# Patient Record
(demographics unavailable — no encounter records)

---

## 2024-12-02 NOTE — ASSESSMENT
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 Now treated hypothyroidism. Recurrent fatigue and daytime sleepiness. Anemia, uncontrolled hypothyroidism have been ruled out. She is currently off MTX and on moderate dose prednisone.  - Continue off Farxiga and Metoprolol XL for now - Off MTX  - Will see pulm next month where she expects her pred will be tapered - RTC 3 months at which time we will repeat her PET off of MTX - Continue treatment with LT4 for hypothyroidism  Siddhartha Mart MD

## 2024-12-02 NOTE — HISTORY OF PRESENT ILLNESS
[FreeTextEntry1] : Date of HF diagnosis: 2019 Etiology of CMP: cardiac sarcoidosis Date of last hospitalization: 2019 Date of last echocardiogram: 1/2023 LVEF/LVEDD: 68%/5.0 cm Type of ischemic work-up: LHC Prior RHC: no  Diabetes: no Afib: no CKD: no ICD/CRT: yes  ACEi/ARB: no ARNI: no MRA: no BB: yes SGLT2i: yes Nitrates/Hydralazine: no  CardioMEMs: no In clinical trial: no  60 yo F PMHx of pulmonary and cardiac sarcoidosis as well as hypothyroidism here for f/u.  She was diagnosed with cardiac sarcoidosis in 10/2019, where she was admitted for syncope and found to have Vtach. cMRI showed hyperenhancement of the inferolateral and apical septal walls, LHC was negative for obstructive coronary disease. FDG-PET showed increase myocardial uptake consistent with cardiac sarcoidosis, EF was preserved at that time. She was started on prednisone and eventually transitioned to steroid sparing Methotrexate due to side effects from prednisone.  She has been stable on prednisone/MTX, one shock in 07/2021, then was shocked twice by her device in 11/2023. She reports feeling lightheaded during this episode. Her device was interrogated by EP and confirmed VT. Since coming to this clinic, an interval FDG- PET did not show any cardiac reactivity but persistent uptake in other tissues.  While followed up she was started on Farxiga as a trial. Her TSH was also found to be severely elevated, so she was treated.  Since I last saw her she has stopped SGLT2i and BB. I see a message in 6/2024 where Dr. Schofield refilled her BB ahead of a trip. She has been off of these meds for several months but feels the same.   She was also weaned off MTX by Dr. Nicole and left on Prednisone, initially 7.5 mg daily and later increased to 20 mg and now at 15 mg. She feels well wit no LE edema, orthopnea.  Has NYHA class I symptoms.   Her ESS is low and I have low suspicion for GIACOMO clinically.

## 2024-12-02 NOTE — CARDIOLOGY SUMMARY
[de-identified] : Jan/2023:  1. Left ventricular systolic function is normal with a calculated ejection fraction of 63 % by Rhodes's biplane method of discs. 2. Right ventricular systolic function is normal. 3. Normal atria. 4. Mild mitral regurgitation. 5. Pulmonary artery systolic pressure could not be estimated. 6. Pericardial fat pad is seen anterior to the right ventricle. 7. Small pericardial effusion noted adjacent to the posterolateral left ventricle.  [de-identified] : PET scan:  There is no evidence of cardiac sarcoid, nor is there uptake to suggest sarcoidosis elsewhere on the scan from the base of the skull to the midthigh.  There is diffuse FDG uptake and sestamibi uptake throughout the thyroid, findings most commonly seen in patients with Hashimoto's thyroiditis.

## 2024-12-02 NOTE — HISTORY OF PRESENT ILLNESS
[FreeTextEntry1] : Date of HF diagnosis: 2019 Etiology of CMP: cardiac sarcoidosis Date of last hospitalization: 2019 Date of last echocardiogram: 1/2023 LVEF/LVEDD: 68%/5.0 cm Type of ischemic work-up: LHC Prior RHC: no  Diabetes: no Afib: no CKD: no ICD/CRT: yes  ACEi/ARB: no ARNI: no MRA: no BB: yes SGLT2i: yes Nitrates/Hydralazine: no  CardioMEMs: no In clinical trial: no  58 yo F PMHx of pulmonary and cardiac sarcoidosis as well as hypothyroidism here for f/u.  She was diagnosed with cardiac sarcoidosis in 10/2019, where she was admitted for syncope and found to have Vtach. cMRI showed hyperenhancement of the inferolateral and apical septal walls, LHC was negative for obstructive coronary disease. FDG-PET showed increase myocardial uptake consistent with cardiac sarcoidosis, EF was preserved at that time. She was started on prednisone and eventually transitioned to steroid sparing Methotrexate due to side effects from prednisone.  She has been stable on prednisone/MTX, one shock in 07/2021, then was shocked twice by her device in 11/2023. She reports feeling lightheaded during this episode. Her device was interrogated by EP and confirmed VT. Since coming to this clinic, an interval FDG- PET did not show any cardiac reactivity but persistent uptake in other tissues.  While followed up she was started on Farxiga as a trial. Her TSH was also found to be severely elevated, so she was treated.  Since I last saw her she has stopped SGLT2i and BB. I see a message in 6/2024 where Dr. Schofield refilled her BB ahead of a trip. She has been off of these meds for several months but feels the same.   She was also weaned off MTX by Dr. Nicole and left on Prednisone, initially 7.5 mg daily and later increased to 20 mg and now at 15 mg. She feels well wit no LE edema, orthopnea.  Has NYHA class I symptoms.   Her ESS is low and I have low suspicion for GIACOMO clinically.

## 2025-01-29 NOTE — HISTORY OF PRESENT ILLNESS
[Never] : never [TextBox_4] : Pt of Dr Bey since 11/2019 referred for possible sarcoidosis. 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 Hospitalized for VT, clean coronaries on cath, mod-sev MR, underwent EP study and ablation for atrial flutter, ICD placed after MRI suggested cardiac sarcoid. CT chest was compatible w pulmonary sarcoid but EBUS/TBBx nondiagnostic.  Cardiac PET at [facility] showed active inflammation c/w cardiac sarcoid - started 30mg prednisone w Bactrim on 9/1/20. Prednisone therapy was complicated by mood changes and insomnia as well as modest weight gain and dyspepsia and so we initiated MTX in 11/2020 to spare prednisone. MTX was titrated up to 15mg weekly in 12/2020 and we did taper her off pred in early 2021.  In July 2021 after travel to [facility], after about 36 hrs of  air travel back home and after clearing customs at [facility] she had syncope due to VT/VF w appropriate shock. Cardiac PET at that time did not show myocardial inflammation. At this point in 9/2021 I did put her back on prednisone 10mg for cough and this helped with the cough as well as some general fatigue and feeling lousy. We subsequently dropped this to 10mg qod and then 7.5mg qod.  She had Covid in 11/2022 and took Paxlovid. She subsequently had an appropriate shock for VT and was found to have very high TSH. She saw Dr Bruno Mart for heart failure and Dr Hoyos for endocrinology. A repeat cardiac PET again did not show active inflammation. Since then has generally done well on MTX; in spring 2023 had one episode NSVT lasting <1 sec, no shock, symptomatic. In 2/2024 we made the decision to taper her off MTX and she was off pred by 4/2024. We then tapered prednisone off by 6/2024 but due to increasing symptoms of fatigue, headache, joint and muscle pains, we put her back on prednisone in 7/2024 and she felt good on 20mg and we tapered to 15mg and planned to continue tapering.  1/29/2025: At last visit (9/2024) we dropped her pred to 15mg and she was well till end Dec when she traveled to [place] and got sick coming home. Severe pharyngitis, cough, I was on vacation so she went to [facility] 4 or so days into the illness and was Covid/flu negative and was given Z pack and tessalon, didn't feel a lot better. Went back 4-5 days later, told basically just wait it out, no more testing or treatment. Had to cancel a trip due to feeling sick. Throat is still scratchy, voice is weird, cannot get rid of cough, super busy at work and exhausted all the time. No fever, not as much phlegm, not dyspneic but feels tight. Lots of PND. Leaving for a 3 week trip to [place] in a month.

## 2025-01-29 NOTE — ASSESSMENT
[FreeTextEntry1] : Data reviewed:  TPMT normal 10/2021  Cardiac PET [facility] 8/2020: metabolic-perfusion mismatch suggestive of active myocardial inflammation; FDG-avid hepatic lesions and possible splenic lesions Cardiac PET [facility] 4/2021: nearly resolved areas of FDG avidity with residual mild FDG avidity in basal inferior wall correlating w area of hypoperfusion / decreased FDG avid hepatic and splenic lesions Cardiac PET [facility] 9/21/21: perfusion defects and mild hypokinesis of basal septum, resting EF 52% / interval resolution of previous FDG updake in myocardium / increased uptake in jammie hilar nodes / interval increased activity in liver and spleen  CT chest [facility] 8/2021 : mild interstitial abnormality PA/lat CXR 9/28/22: clear, AICD  [facility] 8/2/21: restricted, FEV1 78%  Impression: Viral infection with prolonged symptoms Sarcoidosis, cardiac and pulmonary involvement Cardiac sarcoid w appropriate shock 7/2021 and 11/2022 w no inflammation on cardiac PET  - MTX 10/2020 - 4/2024  - cannot tolerate higher doses prednisone Uptake in hilar nodes, liver, spleen 9/2021 Cough   - managed w pred 7.5mg qod - tapered to off June 2024  - back on 20mg 7/2024 for variety of symptoms w good effect, now at 15mg since 12/2024  Plan: Add Dymista and Symbicort for symptoms. Pred 40 x 5 then return to 15mg daily. Labs sent. Get CXR. See me next month as planned and hope to continue tapering prednisone. -- PA/lat CXR [facility] 1/29/2025 personally reviewed : clear lungs, AICD in place

## 2025-02-20 NOTE — HISTORY OF PRESENT ILLNESS
[Never] : never [TextBox_4] : Pt of Dr Bey since 11/2019 referred for possible sarcoidosis. 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 Hospitalized for VT, clean coronaries on cath, mod-sev MR, underwent EP study and ablation for atrial flutter, ICD placed after MRI suggested cardiac sarcoid. CT chest was compatible w pulmonary sarcoid but EBUS/TBBx nondiagnostic.  Cardiac PET at [facility] showed active inflammation c/w cardiac sarcoid - started 30mg prednisone w Bactrim on 9/1/20. Prednisone therapy was complicated by mood changes and insomnia as well as modest weight gain and dyspepsia and so we initiated MTX in 11/2020 to spare prednisone. MTX was titrated up to 15mg weekly in 12/2020 and we did taper her off pred in early 2021.  In July 2021 after travel to [facility], after about 36 hrs of  air travel back home and after clearing customs at [facility] she had syncope due to VT/VF w appropriate shock. Cardiac PET at that time did not show myocardial inflammation. At this point in 9/2021 I did put her back on prednisone 10mg for cough and this helped with the cough as well as some general fatigue and feeling lousy. We subsequently dropped this to 10mg qod and then 7.5mg qod.  She had Covid in 11/2022 and took Paxlovid. She subsequently had an appropriate shock for VT and was found to have very high TSH. She saw Dr Bruno Mart for heart failure and Dr Hoyos for endocrinology. A repeat cardiac PET again did not show active inflammation. Since then has generally done well on MTX; in spring 2023 had one episode NSVT lasting <1 sec, no shock, symptomatic. In 2/2024 we made the decision to taper her off MTX and she was off pred by 4/2024. We then tapered prednisone off by 6/2024 but due to increasing symptoms of fatigue, headache, joint and muscle pains, we put her back on prednisone in 7/2024 and she felt good on 20mg and we tapered to 15mg and planned to continue tapering.  1/29/2025: At last visit (9/2024) we dropped her pred to 15mg and she was well till end Dec when she traveled to [place] and got sick coming home. Severe pharyngitis, cough, I was on vacation so she went to [facility] 4 or so days into the illness and was Covid/flu negative and was given Z pack and tessalon, didn't feel a lot better. Went back 4-5 days later, told basically just wait it out, no more testing or treatment. Had to cancel a trip due to feeling sick. Throat is still scratchy, voice is weird, cannot get rid of cough, super busy at work and exhausted all the time. No fever, not as much phlegm, not dyspneic but feels tight. Lots of PND. Leaving for a 3 week trip to [facility] in a month.  2/20/2025 [[facility]]: She is doing well. She feels much better since starting the Azelastine and Symbicort. She finished the Symbicort and is now just on the nasal spray. She says her cough has resolved and congestion is much better. No recent fevers, chill, infections. Exercises occasionally, admits it has been harder in the winter but no issues with activity.

## 2025-02-20 NOTE — REVIEW OF SYSTEMS
[Nasal Congestion] : nasal congestion [Negative] : Endocrine [TextBox_14] : Nasal congestion in the morning

## 2025-02-20 NOTE — ASSESSMENT
[FreeTextEntry1] : Data reviewed:  TPMT normal 10/2021  Cardiac PET [facility] 8/2020: metabolic-perfusion mismatch suggestive of active myocardial inflammation; FDG-avid hepatic lesions and possible splenic lesions Cardiac PET [facility] 4/2021: nearly resolved areas of FDG avidity with residual mild FDG avidity in basal inferior wall correlating w area of hypoperfusion / decreased FDG avid hepatic and splenic lesions Cardiac PET [facility] 9/21/21: perfusion defects and mild hypokinesis of basal septum, resting EF 52% / interval resolution of previous FDG updake in myocardium / increased uptake in jammie hilar nodes / interval increased activity in liver and spleen  CT chest [facility] 8/2021 : mild interstitial abnormality PA/lat CXR Elida 1/29/2025 personally reviewed : clear lungs, AICD in place  [facility] 8/2/21: restricted, FEV1 78%  Impression: Sarcoidosis, cardiac and pulmonary involvement Cardiac sarcoid w appropriate shock 7/2021 and 11/2022 w no inflammation on cardiac PET  - MTX 10/2020 - 4/2024  - cannot tolerate higher doses prednisone Uptake in hilar nodes, liver, spleen 9/2021 Cough   - managed w pred 7.5mg qod - tapered to off June 2024  - back on 20mg 7/2024 for variety of symptoms w good effect, now at 15mg since 12/2024  Plan: Continue Dymista, refill sent Will refill Symbicort for use prn Following up with her primary care concerning her synthroid dosing Follows with Dr. Carr for cardiology Continue prednisone 15 mg PO qd, discussed reducing dose to 10 mg once she gets back from vacation Ambien refill sent  Will follow up in 4-6 months or sooner if needed -- Attending Addendum  Seen and examined by me and agree w above.

## 2025-03-20 NOTE — REASON FOR VISIT
[Initial Evaluation] : an initial evaluation [Hypothyroidism] : hypothyroidism [DM Type 2] : DM Type 2

## 2025-03-24 NOTE — HISTORY OF PRESENT ILLNESS
[FreeTextEntry1] : Alisia Duncan is a 61 year old female who presents to establish care for hypothyroidism and Diabetes Mellitus.   Previously saw Dr. Hoyos, last in May 2024  PMHx: atrial flutter, hypothyroidism, ICD, obesity, cardiac/pulmonary sarcoidosis, Obesity, Type 2 DM FMHx: pulmonary fibrosis (mother), cardiac disorder/DM (father) PSHx: pacemaker insertion NKDA    Diagnosed with hypothyroidism around 2000 -- had symptoms of fatigue, diagnosed at a wellness center. Took levothyroxine for a while but stopped all medications after her  passed in 2014 Moved to [place] [facility] beds at the [facility] in 2016 and restarted meds, tried to get right dosage, has taken 125, 137, 150mcg in the past.  Was taking 150mcg up until early February 2025 when her PCP reduced dose to 137mcg daily after TSH low (TSH 0.09, FT4 1.8)  Current regimen: Levothyroxine 137mcg daily (has been taking for last 6 weeks) Previous regimens: 125, 137, 150mcg  + antithyroid antibodies (TPO/Tg, see data reviewed) Has had thyroid US in the past (many years ago, around 2001/2002), doesn't think they saw nodules Denies personal/family history of thyroid cancer Denies radiation exposure No amiodarone/lithium use Denies family history of thyroid disease. + other autoimmune disease (sarcoidosis) Denies obstructive symptoms of thyroid disease such as difficulty swallowing, difficulty breathing, voice changes  -- Feels fine now, has lost about 10lbs in the last 6 weeks (attributes to Zepbound/not eating much while in [place] for last 3 weeks). Denies hypo- and hyper- thryoid symptoms   Diabetes, diagnosed on annual physical bloodwork in February 2025 with A1c 6.6% After diagnosis of DM, was started on Zepbound 2.5mg weekly by PCP  She has taken 4 doses of Zepbound and is tolerating well (last dose was 10 days ago; she ran out after trip to [place]) Has lost ~10lbs over past 6 weeks but attributes this more to exercise and limited intake in [place] than Zepbound as most weight loss happened while in [place]  Has taken steroids since diagnosis with cardiac sarcoidosis in 2019 -- initially on high doses, gradually reduced and stopped but restarted in 2024 after recurrence of symptoms   Does not check BG at home Denies symptoms of hypos Last ophthalmologist was in February 2025, no  Denies neuropathy, knowledge of albuminuria, CVD events Has not seen podiatrist  Exercise --walks her dog for total of ~1 hour daily (30 min at each time) Diet  -- Eats more of less 3 meals/day, had noticed decreased nighttime snacking w/Zepbound B - Yogurt and fruit and granola  L - fast food if in office, may skip or have leftovers if at home D- meat/starch/vegetable (boyfriend cooks most nights) Drinks diet coke infrequently, no juice. Mostly seltzer Infrequent dessert intake  Social Hx: Very occasional alcohol use No nicotine/drug use Lives w/boyfriend, no kids. Works as  w/[employer]   Current Medications: albuterol PRN, arexvy IM injection, symbicort 2 puffs BID, flonase, prednisone 15mg daily, zolpidem 10mg daily, levothyroxine 137mcg daily, Zepbound 2.5mg weekly

## 2025-03-24 NOTE — REVIEW OF SYSTEMS
[Recent Weight Loss (___ Lbs)] : recent weight loss: [unfilled] lbs [Negative] : Endocrine [Dysphagia] : no dysphagia [Dysphonia] : no dysphonia [Nausea] : no nausea [Vomiting] : no vomiting

## 2025-03-24 NOTE — ASSESSMENT
[FreeTextEntry1] : 1. Hashimoto's hypothyroidism, diagnosed ~2000 Currently taking levothyroxine 137mcg daily (dose reduced by PCP from 150mcg daily in February 2025 after TSH 0.09, FT4 1.8) Denies symptoms of hypo- and hyper-thyroidism. Appears clinically euthyroid.  Plan -- Labs today, will call w/results -- Continue levothyroxine 137mcg daily unless labs indicate otherwise -- Follow up in 3 months with MD, with myself in Fall 2025  2. Type 2 DM, well controlled, with no known complications   Recently diagnosed with A1c 6.6% in February 2025 (after period of pre-diabetes). Likely steroid induced/impacted as she is taking prednisone 15mg daily for cardiac/pulmonary sarcoidosis. Was started on Zepbound 2.5mg weekly in February by PCP; she is tolerating this well.  We discussed physiology of DM, insulin resistance associated with obesity/steroid use/genetics, carbohydrate metabolism, impact of exercise/activity, blood glucose goals, and use of medication to manage DM. Discussed importance of taking medication, weight loss, managing glucose well to prevent progression given chronic steroid use.   Goal A1c <7%, A1c 6.6% in February Goal BP <130/80, BP today 131/69, not on regimen Goal LDL <100mg/dL, recent , not on regimen   Plan -- Screen for albuminuria -- Increase Zepbound to 5mg weekly (discussed that this is same medication as Mounjaro but as she knows Zepbound is covered w/o auth by insurance, would like to continue with this medication) -- Start checking BG at home few times/week, FBS/PPBS to write on log and bring to next appt. -- Reviewed and encouraged lifestyle modifications for good DM care including aiming for 150 min moderate exercise weekly and ADA healthy eating tips. -- Reviewed hypoglycemia protocol (15/15 rule) -- Continue to follow up with ophthalmologist. -- Follow up with MD in 3 months, with myself in Fall 2025  3. Left thyroid nodule + history of hypothyroidism and antithyroid antibodies Noted on exam, denies compressive symptoms Denies personal/family history of thyroid cancer  Plan -- Thyroid US ordered -- TFTs today, will call w/results -- Follow up with MD in 3 months, with me in Fall 2025

## 2025-03-24 NOTE — HISTORY OF PRESENT ILLNESS
[FreeTextEntry1] : Alisia Duncan is a 61 year old female who presents to establish care for hypothyroidism and Diabetes Mellitus.   Previously saw Dr. Hoyos, last in May 2024  PMHx: atrial flutter, hypothyroidism, ICD, obesity, cardiac/pulmonary sarcoidosis, Obesity, Type 2 DM FMHx: pulmonary fibrosis (mother), cardiac disorder/DM (father) PSHx: pacemaker insertion NKDA    Diagnosed with hypothyroidism around 2000 -- had symptoms of fatigue, diagnosed at a wellness center. Took levothyroxine for a while but stopped all medications after her  passed in 2014 Moved to [facility] in 2016 and restarted meds, tried to get right dosage, has taken 125, 137, 150mcg in the past.  Was taking 150mcg up until early February 2025 when her PCP reduced dose to 137mcg daily after TSH low (TSH 0.09, FT4 1.8)  Current regimen: Levothyroxine 137mcg daily (has been taking for last 6 weeks) Previous regimens: 125, 137, 150mcg  + antithyroid antibodies (TPO/Tg, see data reviewed) Has had thyroid US in the past (many years ago, around 2001/2002), doesn't think they saw nodules Denies personal/family history of thyroid cancer Denies radiation exposure No amiodarone/lithium use Denies family history of thyroid disease. + other autoimmune disease (sarcoidosis) Denies obstructive symptoms of thyroid disease such as difficulty swallowing, difficulty breathing, voice changes  -- Feels fine now, has lost about 10lbs in the last 6 weeks (attributes to Zepbound/not eating much while in [place] for last 3 weeks). Denies hypo- and hyper- thryoid symptoms   Diabetes, diagnosed on annual physical bloodwork in February 2025 with A1c 6.6% After diagnosis of DM, was started on Zepbound 2.5mg weekly by PCP  She has taken 4 doses of Zepbound and is tolerating well (last dose was 10 days ago; she ran out after trip to [place]) Has lost ~10lbs over past 6 weeks but attributes this more to exercise and limited intake in [place] than Zepbound as most weight loss happened while in [place]  Has taken steroids since diagnosis with cardiac sarcoidosis in 2019 -- initially on high doses, gradually reduced and stopped but restarted in 2024 after recurrence of symptoms   Does not check BG at home Denies symptoms of hypos Last ophthalmologist was in February 2025, no  Denies neuropathy, knowledge of albuminuria, CVD events Has not seen podiatrist  Exercise --walks her dog for total of ~1 hour daily (30 min at each time) Diet  -- Eats more of less 3 meals/day, had noticed decreased nighttime snacking w/Zepbound B - Yogurt and fruit and granola  L - fast food if in office, may skip or have leftovers if at home D- meat/starch/vegetable (boyfriend cooks most nights) Drinks diet coke infrequently, no juice. Mostly seltzer Infrequent dessert intake  Social Hx: Very occasional alcohol use No nicotine/drug use Lives w/boyfriend, no kids. Works as  w/[employer]   Current Medications: albuterol PRN, arexvy IM injection, symbicort 2 puffs BID, flonase, prednisone 15mg daily, zolpidem 10mg daily, levothyroxine 137mcg daily, Zepbound 2.5mg weekly

## 2025-03-24 NOTE — PHYSICAL EXAM
[Alert] : alert [No Acute Distress] : no acute distress [EOMI] : extra ocular movement intact [Normal Hearing] : hearing was normal [No Respiratory Distress] : no respiratory distress [No Accessory Muscle Use] : no accessory muscle use [Normal Rate and Effort] : normal respiratory rate and effort [Clear to Auscultation] : lungs were clear to auscultation bilaterally [Normal S1, S2] : normal S1 and S2 [Normal Rate] : heart rate was normal [No Edema] : no peripheral edema [Pedal Pulses Normal] : the pedal pulses are present [Normal Bowel Sounds] : normal bowel sounds [Not Tender] : non-tender [Soft] : abdomen soft [Spine Straight] : spine straight [Normal Gait] : normal gait [No Joint Swelling] : no joint swelling seen [Right foot was examined, including] : right foot ~C was examined, including visual inspection with sensory and pulse exams [Left foot was examined, including] : left foot ~C was examined, including visual inspection with sensory and pulse exams [Normal] : normal [2+] : 2+ in the dorsalis pedis [Oriented x3] : oriented to person, place, and time [Normal Insight/Judgement] : insight and judgment were intact [Acanthosis Nigricans] : no acanthosis nigricans [de-identified] : possible left thyroid nodules on exam [de-identified] : mild moon facies. no SC fat pads or buffalo hump.

## 2025-03-24 NOTE — HISTORY OF PRESENT ILLNESS
[FreeTextEntry1] : Alisia Duncan is a 61 year old female who presents to establish care for hypothyroidism and Diabetes Mellitus.   Previously saw Dr. Hoyos, last in May 2024  PMHx: atrial flutter, hypothyroidism, ICD, obesity, cardiac/pulmonary sarcoidosis, Obesity, Type 2 DM FMHx: pulmonary fibrosis (mother), cardiac disorder/DM (father) PSHx: pacemaker insertion NKDA    Diagnosed with hypothyroidism around 2000 -- had symptoms of fatigue, diagnosed at a wellness center. Took levothyroxine for a while but stopped all medications after her  passed in 2014 Moved to [facility] in 2016 and restarted meds, tried to get right dosage, has taken 125, 137, 150mcg in the past.  Was taking 150mcg up until early February 2025 when her PCP reduced dose to 137mcg daily after TSH low (TSH 0.09, FT4 1.8)  Current regimen: Levothyroxine 137mcg daily (has been taking for last 6 weeks) Previous regimens: 125, 137, 150mcg  + antithyroid antibodies (TPO/Tg, see data reviewed) Has had thyroid US in the past (many years ago, around 2001/2002), doesn't think they saw nodules Denies personal/family history of thyroid cancer Denies radiation exposure No amiodarone/lithium use Denies family history of thyroid disease. + other autoimmune disease (sarcoidosis) Denies obstructive symptoms of thyroid disease such as difficulty swallowing, difficulty breathing, voice changes  -- Feels fine now, has lost about 10lbs in the last 6 weeks (attributes to Zepbound/not eating much while in [place] for last 3 weeks). Denies hypo- and hyper- thryoid symptoms   Diabetes, diagnosed on annual physical bloodwork in February 2025 with A1c 6.6% After diagnosis of DM, was started on Zepbound 2.5mg weekly by PCP  She has taken 4 doses of Zepbound and is tolerating well (last dose was 10 days ago; she ran out after trip to [place]) Has lost ~10lbs over past 6 weeks but attributes this more to exercise and limited intake in [place] than Zepbound as most weight loss happened while in [place]  Has taken steroids since diagnosis with cardiac sarcoidosis in 2019 -- initially on high doses, gradually reduced and stopped but restarted in 2024 after recurrence of symptoms   Does not check BG at home Denies symptoms of hypos Last ophthalmologist was in February 2025, no  Denies neuropathy, knowledge of albuminuria, CVD events Has not seen podiatrist  Exercise --walks her dog for total of ~1 hour daily (30 min at each time) Diet  -- Eats more of less 3 meals/day, had noticed decreased nighttime snacking w/Zepbound B - Yogurt and fruit and granola  L - fast food if in office, may skip or have leftovers if at home D- meat/starch/vegetable (boyfriend cooks most nights) Drinks diet coke infrequently, no juice. Mostly seltzer Infrequent dessert intake  Social Hx: Very occasional alcohol use No nicotine/drug use Lives w/boyfriend, no kids. Works as  w/[employer]   Current Medications: albuterol PRN, arexvy IM injection, symbicort 2 puffs BID, flonase, prednisone 15mg daily, zolpidem 10mg daily, levothyroxine 137mcg daily, Zepbound 2.5mg weekly

## 2025-03-24 NOTE — PHYSICAL EXAM
[Alert] : alert [No Acute Distress] : no acute distress [EOMI] : extra ocular movement intact [Normal Hearing] : hearing was normal [No Respiratory Distress] : no respiratory distress [No Accessory Muscle Use] : no accessory muscle use [Normal Rate and Effort] : normal respiratory rate and effort [Clear to Auscultation] : lungs were clear to auscultation bilaterally [Normal S1, S2] : normal S1 and S2 [Normal Rate] : heart rate was normal [No Edema] : no peripheral edema [Pedal Pulses Normal] : the pedal pulses are present [Normal Bowel Sounds] : normal bowel sounds [Not Tender] : non-tender [Soft] : abdomen soft [Spine Straight] : spine straight [Normal Gait] : normal gait [No Joint Swelling] : no joint swelling seen [Right foot was examined, including] : right foot ~C was examined, including visual inspection with sensory and pulse exams [Left foot was examined, including] : left foot ~C was examined, including visual inspection with sensory and pulse exams [Normal] : normal [2+] : 2+ in the dorsalis pedis [Oriented x3] : oriented to person, place, and time [Normal Insight/Judgement] : insight and judgment were intact [Acanthosis Nigricans] : no acanthosis nigricans [de-identified] : possible left thyroid nodules on exam [de-identified] : mild moon facies. no SC fat pads or buffalo hump.

## 2025-03-24 NOTE — DATA REVIEWED
[FreeTextEntry1] : 2/11/25 total-c 223, , HDL 90, Tg 89 HGB 13.4, HCT 42.9, RDW 13% TSH 0.09, FT4 1.8 HbA1c 6.6% serum creatinine 0.84, GFR 79  1/29/25 - TSH 0.20  7/24/24 - TSH 0.29  5/1/24 -- TSH 1.26  2/23/24 -- TSH 8.43  8/11/23 -- TSH 3.86  01/2023 -- TPO Ab 3650, Tg Ab 64.2  Dec 2022 - TSH 82.80

## 2025-03-24 NOTE — ADDENDUM
[FreeTextEntry1] : 3/21/25 Called pt with results of labs from 3/21/25 TSH 0.03, FT4 2.3 (this is from TSH 0.09, FT4 1.8 on 2/11/25 and TSH 0.2, FT4 1.9 on 1/29/25) I added on TSH R Ab and TSI to labs from yesterday and will look out for results Given the progressive decrease in TSH/increase in FT4 despite decrease in levothyroxine dose, recommended reduction to 112mcg daily and repeat labs in 12 weeks (can increase dose after that if TSH has risen significantly) Weight loss since starting Zepbound may in part be r/t hyperthyroid state? Alisia in agreement with plan, will send prescription -- has follow up with JERONIMO Cueva in June ACR normal  3/24/25 TSH R Ab negative, <1.10

## 2025-03-24 NOTE — PHYSICAL EXAM
[Alert] : alert [No Acute Distress] : no acute distress [EOMI] : extra ocular movement intact [Normal Hearing] : hearing was normal [No Respiratory Distress] : no respiratory distress [No Accessory Muscle Use] : no accessory muscle use [Normal Rate and Effort] : normal respiratory rate and effort [Clear to Auscultation] : lungs were clear to auscultation bilaterally [Normal S1, S2] : normal S1 and S2 [Normal Rate] : heart rate was normal [No Edema] : no peripheral edema [Pedal Pulses Normal] : the pedal pulses are present [Normal Bowel Sounds] : normal bowel sounds [Not Tender] : non-tender [Soft] : abdomen soft [Spine Straight] : spine straight [Normal Gait] : normal gait [No Joint Swelling] : no joint swelling seen [Right foot was examined, including] : right foot ~C was examined, including visual inspection with sensory and pulse exams [Left foot was examined, including] : left foot ~C was examined, including visual inspection with sensory and pulse exams [Normal] : normal [2+] : 2+ in the dorsalis pedis [Oriented x3] : oriented to person, place, and time [Normal Insight/Judgement] : insight and judgment were intact [Acanthosis Nigricans] : no acanthosis nigricans [de-identified] : possible left thyroid nodules on exam [de-identified] : mild moon facies. no SC fat pads or buffalo hump.

## 2025-04-09 NOTE — CARDIOLOGY SUMMARY
[de-identified] : TTE 1/2023:  1. Left ventricular systolic function is normal with a calculated ejection fraction of 63 % by Rhodes's biplane method of discs. 2. Right ventricular systolic function is normal. 3. Normal atria. 4. Mild mitral regurgitation. 5. Pulmonary artery systolic pressure could not be estimated. 6. Pericardial fat pad is seen anterior to the right ventricle. 7. Small pericardial effusion noted adjacent to the posterolateral left ventricle.  [de-identified] : PET scan 2/2023:  There is no evidence of cardiac sarcoid, nor is there uptake to suggest sarcoidosis elsewhere on the scan from the base of the skull to the midthigh.  There is diffuse FDG uptake and sestamibi uptake throughout the thyroid, findings most commonly seen in patients with Hashimoto's thyroiditis.

## 2025-04-09 NOTE — ASSESSMENT
[FreeTextEntry1] : 61F PMH pulmonary and cardiac sarcoidosis, VT s/p ICD placement, hypothyroidism, DM2 presenting for follow up on cardiac sarcoidosis.   #cardiac sarcoid  -ICD interrogation 4/8 reviewed; no episodes of VT or afib or other arryhtmia to explain dizziness episodes  - Continue off Farxiga and Metoprolol XL for now; patient remaining asymptomatic  - Off MTX  -repeat PET/CT scan, ordered today, to assess for myocardial uptake off MTX - Will see pulm next month where she expects her pred will be tapered - RTC 3 months

## 2025-04-09 NOTE — REVIEW OF SYSTEMS
[Dizziness] : dizziness [Negative] : Musculoskeletal [Tremor] : no tremor was seen [Numbness (Hypoesthesia)] : no numbness [Convulsions] : no convulsions [Tingling (Paresthesia)] : no tingling [Weakness] : no weakness [Limb Weakness (Paresis)] : no limb weakness (Paresis) [Speech Disturbance] : no speech disturbance

## 2025-04-09 NOTE — HISTORY OF PRESENT ILLNESS
[FreeTextEntry1] : Date of HF diagnosis: 2019 Etiology of CMP: cardiac sarcoidosis Date of last hospitalization: 2019 Date of last echocardiogram: 1/2023 LVEF/LVEDD: 68%/5.0 cm Type of ischemic work-up: LHC Prior RHC: no  Diabetes: no Afib: no CKD: no ICD/CRT: yes  ACEi/ARB: no ARNI: no MRA: no BB: yes SGLT2i: yes Nitrates/Hydralazine: no  CardioMEMs: no In clinical trial: no  61F PMHx of pulmonary and cardiac sarcoidosis, VT s/p ICD placement, DM2 and hypothyroidism here for f/u.  She was diagnosed with cardiac sarcoidosis in 10/2019, where she was admitted for syncope and found to have Vtach. cMRI showed hyperenhancement of the inferolateral and apical septal walls, LHC was negative for obstructive coronary disease. FDG-PET showed increase myocardial uptake consistent with cardiac sarcoidosis, EF was preserved at that time. She was started on prednisone and eventually transitioned to steroid sparing Methotrexate due to side effects from prednisone.  She has been stable on prednisone/MTX, one shock in 07/2021, then was shocked twice by her device in 11/2023. She reports feeling lightheaded during this episode. Her device was interrogated by EP and confirmed VT. Since coming to this clinic, an interval FDG- PET did not show any cardiac reactivity but persistent uptake in other tissues.  While followed up she was started on Farxiga as a trial. Her TSH was also found to be severely elevated, so she was treated.  She was also weaned off MTX by Dr. Nicole and left on Prednisone, initially 7.5 mg daily and later increased to 20 mg and now at 15 mg.   Presents today for follow up. Feeling generally fine, been having dizzy spells that have going on and off for past few years which has increased in frequency the past few months- gets them every other day now, lasts few seconds and then feels fine. Denies any syncope. No palpitations or associated symptoms. Hard to say if related to stress. Not related to standing, happens randomly. Denies spinning sensation. Breathing fine, denies SOB or orthopnea. Denies any chest pain. No ICD shocks since 3 years ago. No recent hospitalizations. Off methotrexate (since september), farxiga and metoprolol. Current medications are synthroid, zepbound and prednisone 15mg daily. Has been following with endocrine for newly diagnosed DM2, prescribed zepbound for weight loss but currently not taking other DM drugs. Has been checking blood sugars which have been fine.

## 2025-04-09 NOTE — END OF VISIT
[] : Fellow [FreeTextEntry3] : 62 yo woman with cardiac and pulmonary sarcoidosis. Currently off MTX since ~9/2024 on a prednisone taper for recent URI. Has episodes of dizziness but ICD report shows no VT/VF events or AT/AF.  Will repeat FDG PET of the heart to monitor disease activity. RTC 3 months or sooner if PET is abnormal  Siddhartha Mart MD

## 2025-04-09 NOTE — PHYSICAL EXAM
[Normal Venous Pressure] : normal venous pressure [Normal] : no edema, no cyanosis, no clubbing, no varicosities [No Edema] : no edema [Moves all extremities] : moves all extremities

## 2025-06-20 NOTE — PHYSICAL EXAM
[Alert] : alert [No Acute Distress] : no acute distress [EOMI] : extra ocular movement intact [Normal Hearing] : hearing was normal [No Respiratory Distress] : no respiratory distress [No Accessory Muscle Use] : no accessory muscle use [Normal Rate and Effort] : normal respiratory rate and effort [Clear to Auscultation] : lungs were clear to auscultation bilaterally [Normal S1, S2] : normal S1 and S2 [Normal Rate] : heart rate was normal [No Edema] : no peripheral edema [Pedal Pulses Normal] : the pedal pulses are present [Normal Bowel Sounds] : normal bowel sounds [Not Tender] : non-tender [Soft] : abdomen soft [Spine Straight] : spine straight [Normal Gait] : normal gait [No Joint Swelling] : no joint swelling seen [Right foot was examined, including] : right foot ~C was examined, including visual inspection with sensory and pulse exams [Left foot was examined, including] : left foot ~C was examined, including visual inspection with sensory and pulse exams [Normal] : normal [2+] : 2+ in the dorsalis pedis [Oriented x3] : oriented to person, place, and time [Normal Insight/Judgement] : insight and judgment were intact [Supple] : the neck was supple [Acanthosis Nigricans] : no acanthosis nigricans [de-identified] : possible left thyroid nodules on exam [de-identified] : mild moon facies. no SC fat pads or buffalo hump.

## 2025-06-20 NOTE — ASSESSMENT
[FreeTextEntry1] : 1. Hashimoto's hypothyroidism, diagnosed ~2000 Currently taking levothyroxine 137mcg daily (dose reduced by PCP from 150mcg daily in February 2025 after TSH 0.09, FT4 1.8) Denies symptoms of hypo- and hyper-thyroidism. Appears clinically euthyroid.  Plan -Increase Zepbound 7.5 mg -- Labs today, will call w/results -- Continue levothyroxine 137mcg daily unless labs indicate otherwise -- Follow up in 3 months Ms. Duncan in agreement with plan   Davin ZHANG-C, [facility]

## 2025-06-20 NOTE — ASSESSMENT
[FreeTextEntry1] : 1. Hashimoto's hypothyroidism, diagnosed ~2000 Currently taking levothyroxine 137mcg daily (dose reduced by PCP from 150mcg daily in February 2025 after TSH 0.09, FT4 1.8) Denies symptoms of hypo- and hyper-thyroidism. Appears clinically euthyroid.  Plan -Increase Zepbound 7.5 mg -- Labs today, will call w/results -- Continue levothyroxine 137mcg daily unless labs indicate otherwise -- Follow up in 3 months Ms. Duncan in agreement with plan   Davin ZHANG-C, [facility]

## 2025-06-20 NOTE — PHYSICAL EXAM
[Alert] : alert [No Acute Distress] : no acute distress [EOMI] : extra ocular movement intact [Normal Hearing] : hearing was normal [No Respiratory Distress] : no respiratory distress [No Accessory Muscle Use] : no accessory muscle use [Normal Rate and Effort] : normal respiratory rate and effort [Clear to Auscultation] : lungs were clear to auscultation bilaterally [Normal S1, S2] : normal S1 and S2 [Normal Rate] : heart rate was normal [No Edema] : no peripheral edema [Pedal Pulses Normal] : the pedal pulses are present [Normal Bowel Sounds] : normal bowel sounds [Not Tender] : non-tender [Soft] : abdomen soft [Spine Straight] : spine straight [Normal Gait] : normal gait [No Joint Swelling] : no joint swelling seen [Right foot was examined, including] : right foot ~C was examined, including visual inspection with sensory and pulse exams [Left foot was examined, including] : left foot ~C was examined, including visual inspection with sensory and pulse exams [Normal] : normal [2+] : 2+ in the dorsalis pedis [Oriented x3] : oriented to person, place, and time [Normal Insight/Judgement] : insight and judgment were intact [Supple] : the neck was supple [Acanthosis Nigricans] : no acanthosis nigricans [de-identified] : possible left thyroid nodules on exam [de-identified] : mild moon facies. no SC fat pads or buffalo hump.

## 2025-06-20 NOTE — ADDENDUM
[FreeTextEntry1] :  This time included review of previous records, labs, discussing findings, differential diagnosis; glycemic, weight, blood pressure and lipid goals; complications of diabetes; weight management-diet, exercise; prevention and management of hypoglycemia; further testing and evaluation, therapeutic options, renewing medications, completing the record.

## 2025-06-20 NOTE — HISTORY OF PRESENT ILLNESS
[FreeTextEntry1] : Alisia Duncan is a 62 year old female who presents for a follow up for hypothyroidism and Diabetes Mellitus. Patient of Fadia Johnson last seen March 2025 Previously saw Dr. Hoyos, last in May 2024  PMHx: atrial flutter, hypothyroidism, ICD, obesity, cardiac/pulmonary sarcoidosis, Obesity, Type 2 DM FMHx: pulmonary fibrosis (mother), cardiac disorder/DM (father) PSHx: pacemaker insertion NKDA    Diagnosed with hypothyroidism around 2000 -- had symptoms of fatigue, diagnosed at a wellness center. Took levothyroxine for a while but stopped all medications after her  passed in 2014 Moved to [facility] in 2016 and restarted meds, tried to get right dosage, has taken 125, 137, 150mcg in the past.  Was taking 150mcg up until early February 2025 when her PCP reduced dose to 137mcg daily after TSH low (TSH 0.09, FT4 1.8)  Current regimen: Levothyroxine 137mcg daily Previous regimens: 125, 137, 150mcg  + antithyroid antibodies (TPO/Tg, see data reviewed) Has had thyroid US in the past (many years ago, around 2001/2002), doesn't think they saw nodules Denies personal/family history of thyroid cancer Denies radiation exposure No amiodarone/lithium use Denies family history of thyroid disease. + other autoimmune disease (sarcoidosis) Denies obstructive symptoms of thyroid disease such as difficulty swallowing, difficulty breathing, voice changes  -- Feels fine now, has lost about 10lbs in the last 6 weeks (attributes to Zepbound/not eating much while in [place] for last 3 weeks). Denies hypo- and hyper- thryoid symptoms   Diabetes, diagnosed on annual physical bloodwork in February 2025 with A1c 6.6%, 5.4% today POC glucose 89 mg/dL After diagnosis of DM, was started on Zepbound 2.5mg weekly by PCP  Now taking Zepbound 5 mg, however insurance will stop coverage July 1 Has lost ~32 lbs since starting Zepbound and making lifestyle changes  Has taken steroids since diagnosis with cardiac sarcoidosis in 2019 -- initially on high doses, gradually reduced and stopped but restarted in 2024 after recurrence of symptoms -now no longer taking prednisone   -checks BG at home 1-2 times/week reports normal as per chart given at last visit Denies symptoms of hypos Last ophthalmologist was in February 2025, no DR Denies neuropathy, knowledge of albuminuria, CVD events Has not seen podiatrist  Exercise --walks her dog for total of ~1 hour daily (30 min at each time) Diet -- Eats more of less 3 meals/day, had noticed decreased nighttime snacking w/Zepbound B - Yogurt and fruit and granola  L - pre-made salad D- meat/starch/vegetable (boyfriend cooks most nights)-sometimes skips dinner Drinks diet coke infrequently, no juice. Mostly seltzer Infrequent dessert intake-mostly fruits/banana  Social Hx: Very occasional alcohol use No nicotine/drug use Lives w/boyfriend, no kids. Works as  w/[employer]   Current Medications: albuterol PRN, arexvy IM injection, symbicort 2 puffs BID, flonase, zolpidem 10mg daily, levothyroxine 137mcg daily, Lipitor 10 mg daily, Zepbound 5 mg weekly

## 2025-06-20 NOTE — HISTORY OF PRESENT ILLNESS
[FreeTextEntry1] : Alisia Duncan is a 62 year old female who presents for a follow up for hypothyroidism and Diabetes Mellitus. Patient of Fadia Johnson last seen March 2025 Previously saw Dr. Hoyos, last in May 2024  PMHx: atrial flutter, hypothyroidism, ICD, obesity, cardiac/pulmonary sarcoidosis, Obesity, Type 2 DM FMHx: pulmonary fibrosis (mother), cardiac disorder/DM (father) PSHx: pacemaker insertion NKDA    Diagnosed with hypothyroidism around 2000 -- had symptoms of fatigue, diagnosed at a wellness center. Took levothyroxine for a while but stopped all medications after her  passed in 2014 Moved to [facility] in 2016 and restarted meds, tried to get right dosage, has taken 125, 137, 150mcg in the past.  Was taking 150mcg up until early February 2025 when her PCP reduced dose to 137mcg daily after TSH low (TSH 0.09, FT4 1.8)  Current regimen: Levothyroxine 137mcg daily Previous regimens: 125, 137, 150mcg  + antithyroid antibodies (TPO/Tg, see data reviewed) Has had thyroid US in the past (many years ago, around 2001/2002), doesn't think they saw nodules Denies personal/family history of thyroid cancer Denies radiation exposure No amiodarone/lithium use Denies family history of thyroid disease. + other autoimmune disease (sarcoidosis) Denies obstructive symptoms of thyroid disease such as difficulty swallowing, difficulty breathing, voice changes  -- Feels fine now, has lost about 10lbs in the last 6 weeks (attributes to Zepbound/not eating much while in [place] for last 3 weeks). Denies hypo- and hyper- thryoid symptoms   Diabetes, diagnosed on annual physical bloodwork in February 2025 with A1c 6.6%, 5.4% today POC glucose 89 mg/dL After diagnosis of DM, was started on Zepbound 2.5mg weekly by PCP  Now taking Zepbound 5 mg, however insurance will stop coverage July 1 Has lost ~32 lbs since starting Zepbound and making lifestyle changes  Has taken steroids since diagnosis with cardiac sarcoidosis in 2019 -- initially on high doses, gradually reduced and stopped but restarted in 2024 after recurrence of symptoms -now no longer taking prednisone   -checks BG at home 1-2 times/week reports normal as per chart given at last visit Denies symptoms of hypos Last ophthalmologist was in February 2025, no DR Denies neuropathy, knowledge of albuminuria, CVD events Has not seen podiatrist  Exercise --walks her dog for total of ~1 hour daily (30 min at each time) Diet -- Eats more of less 3 meals/day, had noticed decreased nighttime snacking w/Zepbound B - Yogurt and fruit and granola  L - pre-made salad D- meat/starch/vegetable (boyfriend cooks most nights)-sometimes skips dinner Drinks diet coke infrequently, no juice. Mostly seltzer Infrequent dessert intake-mostly fruits/banana  Social Hx: Very occasional alcohol use No nicotine/drug use Lives w/boyfriend, no kids. Works as  w/[employer]   Current Medications: albuterol PRN, arexvy IM injection, symbicort 2 puffs BID, flonase, zolpidem 10mg daily, levothyroxine 137mcg daily, Lipitor 10 mg daily, Zepbound 5 mg weekly

## 2025-07-18 NOTE — ASSESSMENT
[FreeTextEntry1] : 61 yo woman with medial and lateral epicondylitis right elbow that started a week ago without any injury.  She does work at a computer most of the day. This is a common condition in middle-age and associated with tendinopathy of the flexor and extensor tendons at the elbow. There can be small partial tears. I have recommended relative rest.  I gave her a note to be out of work until next Thursday to rest her elbow more and then when she goes back she should try to take more frequent breaks and stretch her elbow.  Heat and ice as needed.  She will continue with the Naprosyn 500 mg twice a day for about 2 weeks and then take 1 a day for a week and then as needed.  She could try Voltaren gel as a topical anti-inflammatory after that.  I gave her home exercises to try with stretching and strengthening. This condition often will linger but often will get better over time. Occasionally we may try injections of steroids or PRP Rarely surgery is needed. Follow-up in 6 to 8 weeks

## 2025-07-18 NOTE — PHYSICAL EXAM
[UE] : Sensory: Intact in bilateral upper extremities [Normal RUE] : Right Upper Extremity: No scars, rashes, lesions, ulcers, skin intact [Normal LUE] : Left Upper Extremity: No scars, rashes, lesions, ulcers, skin intact [Normal Touch] : sensation intact for touch [Normal] : Oriented to person, place, and time, insight and judgement were intact and the affect was normal [de-identified] : Right elbow/wrist No edema, ecchymosis, or erythema Skin is intact ROM elbow 0-145 degrees of flexion and 85 degrees of pronation and supination Tender medial left elbow greater than lateral epicondyles and extensor tendon origin. Slight Tinel's over the cubital tunnel. Elbow is stable. Motor and sensation are intact distally throughout including ulnar nerve. Pain is reproduced on the medial elbow with resisted forearm pronation. No pain with resisted middle finger extension or supination of the elbow against resistance. [de-identified] :  X-rays ordered, performed and reviewed today of RIGHT elbow for elbow pain AP, lateral and oblique views showed no abnormalities.  No calcifications.  No fractures.  Unremarkable

## 2025-07-18 NOTE — HISTORY OF PRESENT ILLNESS
[de-identified] : 61 yo left-hand-dominant woman  with history of sarcoidosis presents for right elbow-wrist pain that started a week ago suddenly. There was no injury. 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  She had been working about 3 to 4 hours with her elbow bent and when she straightened it she felt pain pain is localized to the medial elbow and also has a pain on the lateral side that radiates to wrist.  She feels it with grasping and states that she has dropped items for her RIGHT hand due to the pain. She saw her PCP who recommended physical therapy and naproxen 500 mg twice a day. She has been taking the Naproxen twice a day and hasnt noticed any improvement yet. She has not started PT. Pain is constant.  She has not started any therapy or treatment. No numbness or tingling or shooting pain to the small finger. No prior elbow issues. She had been on steroids earlier this year and in the past couple years for sarcoidosis which was in her chest and abdomen.  She was able to come off steroids this year

## 2025-07-30 NOTE — PHYSICAL EXAM
[Normal Venous Pressure] : normal venous pressure [Normal] : no edema, no cyanosis, no clubbing, no varicosities [No Edema] : no edema [Moves all extremities] : moves all extremities [Normal Conjunctiva] : normal conjunctiva

## 2025-07-30 NOTE — END OF VISIT
[] : Fellow [FreeTextEntry3] : I, Dr. Carr, personally performed the evaluation and management (E/M) services for this patient who presents today. Today, my ACP, Leticia Richardson was here to observe my evaluation and management of services.  60 yo woman with cardiac and pulmonary sarcoidosis. Currently off MTX since ~9/2024 and now off prednisone. Repeat FDG PET showed no sarcoidosis activity.  Continue off BB, SGLT2i and immunosuppression. Will see her in 6 months.   Siddhartha Mart MD

## 2025-07-30 NOTE — CARDIOLOGY SUMMARY
[de-identified] : TTE 1/2023: LVIDd 5 cm, LVEF 63%, indeterminate diastolic function, severe LVH (septum 1.3, PWT 1.2), normal RV size and function, normal biatrial size, mild MR, unable to calculate PASP, small pericaridal effusion adjacent to posterolateral LV [de-identified] : PET-CT 5/8/25: no clear evidence of active sarcoidosis in either in the lungs, mediastinal lymph nodes or in the myocardium. Again, incidental note is made of increased activity in the thyroid, presumably due to Hashimoto's thyroiditis.   [de-identified] : PET scan 2/2023:  There is no evidence of cardiac sarcoid, nor is there uptake to suggest sarcoidosis elsewhere on the scan from the base of the skull to the midthigh.  There is diffuse FDG uptake and sestamibi uptake throughout the thyroid, findings most commonly seen in patients with Hashimoto's thyroiditis.

## 2025-07-30 NOTE — HISTORY OF PRESENT ILLNESS
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  She was diagnosed with cardiac sarcoidosis in 10/2019, where she was admitted for syncope and found to have Vtach. cMRI showed hyperenhancement of the inferolateral and apical septal walls, LHC was negative for obstructive coronary disease. FDG-PET showed increase myocardial uptake consistent with cardiac sarcoidosis, EF was preserved at that time. She was started on prednisone and eventually transitioned to steroid sparing Methotrexate in early 2021 due to side effects from prednisone.  She has been stable on prednisone/MTX, one shock in 07/2021, then was shocked twice by her device in 11/2023. Around this time also had very high TSH. She reports feeling lightheaded during this episode. Her device was interrogated by EP and confirmed VT. Since coming to this clinic, an interval FDG- PET did not show any cardiac reactivity but persistent uptake in other tissues.  In 4/2024, MTX was tapered off. Prednisone was resumed for a variety of symptoms with good effect. Has been on 15 mg QD since 12/2024 with plan for gradual reduction.   ===  Presents today for follow up. Since last seen in April, underwent repeat PET-CT in May that did not show evidence of active sarcoid in lungs, myocardium or lymph nodes. She feels at her baseline without SOB. No CP, palpitations or syncope.

## 2025-07-30 NOTE — ASSESSMENT
[FreeTextEntry1] : 62F PMH pulmonary and cardiac sarcoidosis, VT s/p ICD placement with subsequent shocks (last in 2023), hypothyroidism and DM2 presenting for follow up on cardiac sarcoidosis. Recent   # Cardiac sarcoid  - PET-CT 5/2025 without evidence of active sarcoid in myocardium, lungs and mediastinal lymph nodes. Will repeat in one year  - ICD interrogation 4/8/25 without VT, AF or other arrythmias  - Previously self stopped Farxiga and Toprol XL in 2024 for unclear reasons, can remain off as she feels unchanged - Follow-up with Dr. Nicole for Prednisone taper, currently on 15 mg QD. Off Methotrexate sine 4/2024   Follow-up with Dr. Carr in 6 months